# Patient Record
(demographics unavailable — no encounter records)

---

## 2019-03-24 NOTE — ER DOCUMENT REPORT
ED General





- General


Chief Complaint: Congestion


Stated Complaint: COUGH,CONGESTION,HEADACHE,BODYACHE


Time Seen by Provider: 03/24/19 23:41


Primary Care Provider: 


BART COWAN MD [Primary Care Provider] - Follow up in 3-5 days


Notes: 





Patient is a pleasant 51-year-old female presents with complaint of pain in her 

head and face that she says is mostly over the maxillary sinuses.  She is had 

some cough and congestion.  She is a smoker.  Pain is a bit worse when she leans

forward.  No fevers.  No vomiting.  No diarrhea.  She has been having greenish 

discharge from her nose when she sneezes.  No other complaints at this time.  

Symptoms have been ongoing for approximately a week.


TRAVEL OUTSIDE OF THE [place] IN LAST 30 DAYS: No





- Related Data


Allergies/Adverse Reactions: 


                                        





latex [Latex] Allergy (Verified 11/29/18 12:27)


   


prednisone [Prednisone] Allergy (Verified 11/29/18 12:27)


   











Past Medical History





- Social History


Smoking Status: Current Every Day Smoker


Frequency of alcohol use: None


Drug Abuse: None


Family History: Reviewed & Not Pertinent, Other - Blood clot, daughter Son and 

mother kidney disease





- Past Medical History


Cardiac Medical History: Reports: Hx DVT, Hx Hypercholesterolemia, Hx 

Hypertension


Pulmonary Medical History: Reports: Hx Bronchitis, Hx COPD, Hx Pneumonia


Neurological Medical History: Reports: Hx Migraine.  Denies: Hx Seizures


Renal/ Medical History: Reports: Hx Kidney Stones.  Denies: Hx Peritoneal 

Dialysis


GI Medical History: Reports: Hx Gastroesophageal Reflux Disease, Hx Colonoscopy,

 Hx Endoscopy


Musculoskeletal Medical History: Reports Hx Arthritis, Reports Hx 

Musculoskeletal Trauma


Psychiatric Medical History: Reports: Hx Anxiety, Hx Depression


Traumatic Medical History: Reports: Hx Fractures - Foot


Past Surgical History: Reports: Hx Hysterectomy, Hx Orthopedic Surgery - back x 

6, right knee, Hx Tubal Ligation





- Immunizations


Immunizations up to date: Yes


Hx Diphtheria, Pertussis, Tetanus Vaccination: Yes





Review of Systems





- Review of Systems


Notes: 





My Normal Review Basic





REVIEW OF SYSTEMS:


CONSTITUTIONAL :  Denies fever,  chills, or sweats.  Denies recent illness.


EENT:   Sinus pressure and congestion.


RESPIRATORY: Some cough


GASTROINTESTINAL:  Denies abdominal pain.  Denies nausea, vomiting, or diarrhea.

 


MUSCULOSKELETAL:  Denies neck or back pain or joint pain or swelling.


SKIN:   Denies rash or skin lesions.


ALL OTHER SYSTEMS REVIEWED AND NEGATIVE.





Physical Exam





- Vital signs


Vitals: 


                                        











Temp Pulse Resp BP Pulse Ox


 


 98.2 F   84   18   147/81 H  98 


 


 03/24/19 22:27  03/24/19 22:27  03/24/19 22:27  03/24/19 22:27  03/24/19 22:27














- Notes


Notes: 





General Appearance: Well nourished, alert, cooperative, no acute distress, no 

obvious discomfort.


Vitals: reviewed, See vital signs table.


Head: Pressure to palpation over maxillary sinuses.


Eyes: PERRL, EOMI, Conjuctiva clear


Mouth: No decreasd moisture


Throat: No tonsillar inflammation, No airway obstruction,  No lymphadenopathy


Neck: Supple, no neck tenderness, No thyromegaly


Lungs: Few scattered wheezes.


Heart: Normal rate, Regular rythm, No murmur, no rub


Skin: warm, dry, appropriate color, no rash


Neuro: speech clear, oriented x 3, normal affect, responds appropriately to 

questions.





Course





- Re-evaluation


Re-evalutation: 





03/24/19 23:55


Patient is a dry cough on exam.  Lung fields are clear except for just very mild

 scattered wheezing.  Will place patient on albuterol inhaler.  Encouraged her 

to quit smoking.  She does have what appears to be a sinusitis.  She has 

pressure mainly over maxillary sinuses worse with certain positions and worse 

with coughing and she is been having green mucus is not coming from nares.  At 

this time we will place her on Augmentin.  I strongly encouraged her return to 

ER immediately if she has fevers, worsening pain, difficulty breathing, or feels

 unwell.  She is to follow-up with her primary care doctor this week.  Patient 

agrees with plan and will be discharged home.





Dictation of this chart was performed using voice recognition software; 

therefore, there may be some unintended grammatical errors.





- Vital Signs


Vital signs: 


                                        











Temp Pulse Resp BP Pulse Ox


 


 97.5 F   74   18   148/86 H  100 


 


 03/25/19 00:05  03/25/19 00:05  03/25/19 00:05  03/25/19 00:05  03/25/19 00:05














Discharge





- Discharge


Clinical Impression: 


 Sinusitis, Bronchitis





Condition: Good


Disposition: HOME, SELF-CARE


Additional Instructions: 


Please take the Augmentin as prescribed.  Please follow-up with your primary 

care doctor in 5 days for reevaluation.  Please return to ER immediately if you 

have difficulty breathing, worsening cough despite the inhaler, fevers, or feel 

that you are worsening.  Use the inhaler as 2 puffs every 2-4 hours for cough.  

Please try to quit smoking.


Prescriptions: 


Amox Tr/Potassium Clavulanate [Augmentin 875-125 Tablet] 1 tab PO BID 10 Days  

tablet


Referrals: 


BART COWAN MD [Primary Care Provider] - Follow up in 3-5 days

## 2019-07-28 NOTE — ER DOCUMENT REPORT
ED Medical Screen (RME)





- General


Chief Complaint: Possible Kidney Stone


Stated Complaint: ABDOMINAL PAIN


Time Seen by Provider: 07/28/19 01:20


Primary Care Provider: 


BART COWAN MD [Primary Care Provider] - Follow up as needed


Mode of Arrival: Ambulatory


Information source: Patient


Notes: 





51-year-old female presented to ED for complaint of severe right flank pain that

started on Friday.  She states it got worse Saturday about 9 PM.  She states she

is having some trouble urinating since 9 PM on Saturday.  She states she has had

multiple kidney stones in the past the last one has been about a year ago.  She 

also has a history of bulging disc degenerative disc disease and torn ligaments 

in the right knee.  She has had back surgeries in the past and a bilateral tubal

ligation.  She states she is not allergic to steroids and latex.  She states she

smokes between 1/2 to 1 pack/day and drinks occasionally.  Patient lives with 

her son and daughter-in-law.  Patient is alert oriented respirations regular and

unlabored speaking in full sentences walks with a bending to the right due to 

her right flank pain.  Will order urine and renal ultrasound.  Patient was 

ordered Toradol Zofran and IV fluids.











I have greeted and performed a rapid initial assessment of this patient.  A 

comprehensive ED assessment and evaluation of the patient, analysis of test r

esults and completion of medical decision making process will be conducted by an

additional ED providers.








Dictation of this chart was performed using voice recognition software; 

therefore, there may be some unintended grammatical errors.


TRAVEL OUTSIDE OF THE [place] IN LAST 30 DAYS: No





- Related Data


Allergies/Adverse Reactions: 


                                        





latex [Latex] Allergy (Verified 11/29/18 12:27)


   


prednisone [Prednisone] Allergy (Verified 11/29/18 12:27)


   











Past Medical History





- Social History


Chew tobacco use (# tins/day): No


Frequency of alcohol use: Occasional


Drug Abuse: None





- Past Medical History


Cardiac Medical History: Reports: Hx DVT, Hx Hypercholesterolemia, Hx 

Hypertension


Pulmonary Medical History: Reports: Hx Bronchitis, Hx COPD, Hx Pneumonia


Neurological Medical History: Reports: Hx Migraine.  Denies: Hx Seizures


Renal/ Medical History: Reports: Hx Kidney Stones.  Denies: Hx Peritoneal 

Dialysis


GI Medical History: Reports: Hx Gastroesophageal Reflux Disease, Hx Colonoscopy,

 Hx Endoscopy


Musculoskeltal Medical History: Reports Hx Arthritis, Reports Hx Musculoskeletal

 Trauma


Psychiatric Medical History: Reports: Hx Anxiety, Hx Depression


Traumatic Medical History: Reports: Hx Fractures - Foot


Past Surgical History: Reports: Hx Hysterectomy, Hx Orthopedic Surgery - back x 

6, right knee, Hx Tubal Ligation





- Immunizations


Immunizations up to date: Yes


Hx Diphtheria, Pertussis, Tetanus Vaccination: Yes


History of Influenza Vaccine for 10/2017 - 3/2018 Season: No





Physical Exam





- Vital signs


Vitals: 





                                        











Temp Pulse Resp BP Pulse Ox


 


 97.4 F   82   14   146/81 H  100 


 


 07/28/19 00:19  07/28/19 00:19 07/28/19 00:19 07/28/19 00:19 07/28/19 00:19














Course





- Vital Signs


Vital signs: 





                                        











Temp Pulse Resp BP Pulse Ox


 


 97.4 F   82   14   146/81 H  100 


 


 07/28/19 00:19  07/28/19 00:19 07/28/19 00:19 07/28/19 00:19 07/28/19 00:19














Doctor's Discharge





- Discharge


Referrals: 


BART COWAN MD [Primary Care Provider] - Follow up as needed

## 2019-07-28 NOTE — RADIOLOGY REPORT (SQ)
EXAM DESCRIPTION: 



CT ABDOMEN PELVIS WITHOUT IV CONTRAST



COMPLETED DATE/TME:  07/28/2019 03:40



CLINICAL HISTORY: 



51 years, Female, Flank pain, hx stones



COMPARISON:

9/18/2015 CT



TECHNIQUE:

307  Images stored on PACS.

 

All CT scanners at this facility use dose modulation, iterative

reconstruction, and/or weight based dosing when appropriate to

reduce radiation dose to as low as reasonably achievable (ALARA).





[facility]: Dose Right [facility]: CareDose   [facility]: Dose Right    [facility]:

Teradose 4D    [facility]: Smart Technologies



LIMITATIONS:

None.



FINDINGS:



The lung bases are unremarkable. Postsurgical changes of the

lumbar spine. Osseous structures are otherwise grossly intact.

The liver, spleen, adrenal glands and pancreas are unremarkable.

8 mm nonobstructing calculus in the inferior pole of the left

kidney. Moderate to severe right hydroureteronephrosis, secondary

to an approximately 4.7 mm distal right ureteral calculus. In

addition there is a second calculus in the right UVJ measuring

8.9 mm. Nonobstructing calculi in the inferior pole of the right

kidney. Right perinephric inflammatory changes. No free air or

free fluid. No gross evidence for bowel obstruction. Normal

appendix





IMPRESSION:



Nonobstructing renal calculi bilaterally.



Superimposed moderate to severe right hydroureteronephrosis,

secondary to two obstructing calculi in the distal right ureter

as above

 

TECHNICAL DOCUMENTATION:



Quality ID # 436: Final reports with documentation of one or more

dose reduction techniques (e.g., Automated exposure control,

adjustment of the mA and/or kV according to patient size, use of

iterative reconstruction technique)



copyright 2011 [employer]- All Rights Reserved

## 2019-07-28 NOTE — RADIOLOGY REPORT (SQ)
CLINICAL HISTORY:  right flank pain 



COMPARISON: None.



TECHNIQUE: US RETROPERITONEUM on 7/28/2019 1:25 AM CDT



FINDINGS: 



Right kidney measures 12.4 cm and contains several calcifications

measuring up to 1 cm. There is mild hydronephrosis. Left kidney

measures 9.3 cm and contains a 1.3 cm echogenic focus. There is

no left hydronephrosis.



IMPRESSION: 



Right hydronephrosis.

## 2019-11-01 NOTE — OPERATIVE REPORT
Operative Report


DATE OF SURGERY: 11/01/19


Operative Report: 





Risks, benefits and alternatives of the procedure including the risk of 

bleeding, perforation requiring surgery have been explained to the patient in 

detail and informed consent has been obtained.  Patient is taken back to the 

endoscopy suite and placed on left, lateral decubital position.  Timeout was 

called.  Propofol medication is administered.  Rectal examination is done which 

did not reveal any masses, tears or fissures.  An Olympus videoscope was in

troduced into the patient's rectum.  Scope was then carefully advanced all the 

way to the cecum.  Cecum was identified by the usual anatomical landmarks 

including the ileocecal valve as well as the appendiceal office.  

Photodocumentation is obtained.  Scope was then sequentially pulled back via the

various segments of the colon including the ascending colon, hepatic flexure, 

transverse colon, splenic flexure, descending colon and finally into the 

rectosigmoid portions of the colon.  Retroflexion maneuver is performed.


PREOPERATIVE DIAGNOSIS: Personal history of polyps


POSTOPERATIVE DIAGNOSIS: Cecal polyp sessile removed via snare polypectomy and 

retrieved.  Hepatic flexure polyp polypoid removed via biopsy forceps.  Internal

hemorrhoids


OPERATION: Colonoscopy with snare polypectomy.  Colonoscopy with biopsy


SURGEON: JOE RICHARDS


ANESTHESIA: LMAC


TISSUE REMOVED OR ALTERED: As noted above.


COMPLICATIONS: 





None.


ESTIMATED BLOOD LOSS: None.


INTRAOPERATIVE FINDINGS: As noted above.


PROCEDURE: 





Patient tolerated the procedure well.


No immediate postprocedure complications are noted.


Patient is discharged in good condition.


Discharge date 11/1/2019.


Discharge diet: Regular.


Discharge activity: Regular.


2 to 3-week follow-up to discuss findings.


Patient is instructed to call the office or proceed to the emergency room should

there be any further questions.


3 to 5-year surveillance colonoscopy.

## 2019-12-18 NOTE — WOMENS IMAGING REPORT
EXAM DESCRIPTION:  3D SCREENING MAMMO BILAT



COMPLETED DATE/TIME:  12/17/2019 7:30 am



REASON FOR STUDY:  Z12.31 ENCOUNTER FOR SCREENING MAMMOGRAM FOR MALIGNANT NEOPLASM OF BREAST Z12.31  
ENCNTR SCREEN MAMMOGRAM FOR MALIGNANT NEOPLASM OF YOSI



COMPARISON:  2013 outside facility.



EXAM PARAMETERS:  Views: Standard craniocaudal and mediolateral oblique views of each breast recorded
 using digital acquisition and breast tomosynthesis.

Read with the assistance of CAD.

.[facility] [employer]  Version 9.2



LIMITATIONS:  None.



FINDINGS:  No suspicious masses, suspicious calcifications or architectural distortion. No areas of c
oncern.



IMPRESSION:   NEGATIVE MAMMOGRAM. BIRADS 1.



BREAST DENSITY:  b. There are scattered areas of fibroglandular density.



BIRAD:  ASSESSMENT:  1 NEGATIVE



RECOMMENDATION:  ROUTINE SCREENING



COMMENT:  The patient has been notified of the results by letter per MQSA requirements. Additional no
tification policies are in place for contacting patient with suspicious or incomplete findings.

Quality ID #225: The American College of Radiology recommends an annual screening mammogram for women
 aged 40 years or over. This facility utilizes a reminder system to ensure that all patients receive 
reminder letters, and/or direct phone calls for appointments. This includes reminders for routine scr
eening mammograms, diagnostic mammograms, or other Breast Imaging Interventions when appropriate.  Th
is patient will be placed in the appropriate reminder system.



TECHNICAL DOCUMENTATION:  FINDING NUMBER: (1)

ASSESSMENT:  (1)

JOB ID:  [id number]

 2011 [employer]- All Rights Reserved



Reading location - IP/workstation name: [email or web address]